# Patient Record
Sex: FEMALE | Race: WHITE | NOT HISPANIC OR LATINO | Employment: UNEMPLOYED | URBAN - METROPOLITAN AREA
[De-identification: names, ages, dates, MRNs, and addresses within clinical notes are randomized per-mention and may not be internally consistent; named-entity substitution may affect disease eponyms.]

---

## 2018-06-01 ENCOUNTER — OFFICE VISIT CONVERTED (OUTPATIENT)
Dept: GASTROENTEROLOGY | Facility: HOSPITAL | Age: 43
End: 2018-06-01
Attending: NURSE PRACTITIONER

## 2019-08-15 ENCOUNTER — OFFICE VISIT CONVERTED (OUTPATIENT)
Dept: FAMILY MEDICINE CLINIC | Facility: CLINIC | Age: 44
End: 2019-08-15
Attending: NURSE PRACTITIONER

## 2019-08-15 ENCOUNTER — HOSPITAL ENCOUNTER (OUTPATIENT)
Dept: FAMILY MEDICINE CLINIC | Facility: CLINIC | Age: 44
Discharge: HOME | End: 2019-08-15
Attending: NURSE PRACTITIONER

## 2019-08-15 LAB
ALBUMIN SERPL-MCNC: 4.2 G/DL (ref 3.5–5)
ALBUMIN/GLOB SERPL: 1.2 {RATIO} (ref 1.4–2.6)
ALP SERPL-CCNC: 84 U/L (ref 42–98)
ALT SERPL-CCNC: 48 U/L (ref 10–40)
ANION GAP SERPL CALC-SCNC: 16 MMOL/L (ref 8–19)
AST SERPL-CCNC: 48 U/L (ref 15–50)
BASOPHILS # BLD AUTO: 0.05 10*3/UL (ref 0–0.2)
BASOPHILS NFR BLD AUTO: 0.4 % (ref 0–3)
BILIRUB SERPL-MCNC: 0.38 MG/DL (ref 0.2–1.3)
BUN SERPL-MCNC: 10 MG/DL (ref 5–25)
BUN/CREAT SERPL: 12 {RATIO} (ref 6–20)
CALCIUM SERPL-MCNC: 9.2 MG/DL (ref 8.7–10.4)
CHLORIDE SERPL-SCNC: 102 MMOL/L (ref 99–111)
CHOLEST SERPL-MCNC: 252 MG/DL (ref 107–200)
CHOLEST/HDLC SERPL: 2.9 {RATIO} (ref 3–6)
CONV ABS IMM GRAN: 0.05 10*3/UL (ref 0–0.2)
CONV CO2: 24 MMOL/L (ref 22–32)
CONV IMMATURE GRAN: 0.4 % (ref 0–1.8)
CONV TOTAL PROTEIN: 7.6 G/DL (ref 6.3–8.2)
CREAT UR-MCNC: 0.86 MG/DL (ref 0.5–0.9)
DEPRECATED RDW RBC AUTO: 50.5 FL (ref 36.4–46.3)
EOSINOPHIL # BLD AUTO: 0.16 10*3/UL (ref 0–0.7)
EOSINOPHIL # BLD AUTO: 1.4 % (ref 0–7)
ERYTHROCYTE [DISTWIDTH] IN BLOOD BY AUTOMATED COUNT: 13.9 % (ref 11.7–14.4)
GFR SERPLBLD BASED ON 1.73 SQ M-ARVRAT: >60 ML/MIN/{1.73_M2}
GLOBULIN UR ELPH-MCNC: 3.4 G/DL (ref 2–3.5)
GLUCOSE SERPL-MCNC: 93 MG/DL (ref 65–99)
HCT VFR BLD AUTO: 46.6 % (ref 37–47)
HDLC SERPL-MCNC: 86 MG/DL (ref 40–60)
HGB BLD-MCNC: 15.4 G/DL (ref 12–16)
LDLC SERPL CALC-MCNC: 147 MG/DL (ref 70–100)
LYMPHOCYTES # BLD AUTO: 2.16 10*3/UL (ref 1–5)
LYMPHOCYTES NFR BLD AUTO: 18.3 % (ref 20–45)
MCH RBC QN AUTO: 32.2 PG (ref 27–31)
MCHC RBC AUTO-ENTMCNC: 33 G/DL (ref 33–37)
MCV RBC AUTO: 97.5 FL (ref 81–99)
MONOCYTES # BLD AUTO: 0.51 10*3/UL (ref 0.2–1.2)
MONOCYTES NFR BLD AUTO: 4.3 % (ref 3–10)
NEUTROPHILS # BLD AUTO: 8.85 10*3/UL (ref 2–8)
NEUTROPHILS NFR BLD AUTO: 75.2 % (ref 30–85)
NRBC CBCN: 0 % (ref 0–0.7)
OSMOLALITY SERPL CALC.SUM OF ELEC: 285 MOSM/KG (ref 273–304)
PLATELET # BLD AUTO: 208 10*3/UL (ref 130–400)
PMV BLD AUTO: 11.9 FL (ref 9.4–12.3)
POTASSIUM SERPL-SCNC: 4.3 MMOL/L (ref 3.5–5.3)
RBC # BLD AUTO: 4.78 10*6/UL (ref 4.2–5.4)
SODIUM SERPL-SCNC: 138 MMOL/L (ref 135–147)
TRIGL SERPL-MCNC: 97 MG/DL (ref 40–150)
TSH SERPL-ACNC: 0.87 M[IU]/L (ref 0.27–4.2)
VLDLC SERPL-MCNC: 19 MG/DL (ref 5–37)
WBC # BLD AUTO: 11.78 10*3/UL (ref 4.8–10.8)

## 2021-05-15 VITALS
SYSTOLIC BLOOD PRESSURE: 132 MMHG | OXYGEN SATURATION: 99 % | HEART RATE: 92 BPM | HEIGHT: 68 IN | TEMPERATURE: 98.1 F | BODY MASS INDEX: 28.21 KG/M2 | DIASTOLIC BLOOD PRESSURE: 82 MMHG | WEIGHT: 186.12 LBS

## 2021-05-28 VITALS
SYSTOLIC BLOOD PRESSURE: 105 MMHG | HEIGHT: 68 IN | WEIGHT: 186.12 LBS | BODY MASS INDEX: 28.21 KG/M2 | DIASTOLIC BLOOD PRESSURE: 72 MMHG

## 2021-05-28 NOTE — PROGRESS NOTES
Patient: NOE PEARCE     Acct: [id number]     Report: #[id number]  UNIT #: [id number]     : 1975    Encounter Date:2018  PRIMARY CARE:   ***ISigned***  --------------------------------------------------------------------------------------------------------------------  DATE: 18      LOGAN BHAKTA      Chief Complaint      HEPATITIS C W/O HEPATIC COMA            Allergies      Coded Allergies:             *No Known Allergies (Verified  Allergy, Mild, NONE, 17)            Medications      Last Reconciled on 18 11:08 by JUAN REINA      Sertraline HCl (Sertraline*) 20 Mg/1 Ml Oral.conc      50 MG PO QDAY, ML         Reported         18       (Zoloft)   No Conflict Check               Reported         06            Vitals      Height 5 ft 8.00 in / 172.72 cm      Weight 186 lbs 2.000 oz / 84.832621 kg      BSA 2.03 m2      BMI 28.3 kg/m2      Blood Pressure 105/72            Yes: Hx Abdominal Surgery ( x 2)      Cancer/Type - Family Hx:  Father (lymphoma)      Social History:  Tobacco Use (1 ppd), Alcohol Use      Smoking status:  Current every day smoker (1 ppd)      Smoking history:  25-50 pack years      Counseling given:  Patient declined      Medical History:  No: Hx Sleep Apnea      Psychiatric History:  Yes: Hx Depression (TAKES ZOLOFT 150MG), Hx Anxiety            PREVENTION      Hx Influenza Vaccination:  No      Hx Pneumococcal Vaccination:  No      Chart initiated by      DL            General:  No Fatigue, No Weight Loss      HEENT:  No Dysphagia, No Visual Changes      Respiratory:  No Cough, No Dyspnea      Cardiology:  No Chest Pain, No Palpitations      Gastrointestinal:  No Diarrhea, No Constipation      Genitourinary:  No Dysuria, No Frequency      Musculoskeletal:  No Joint Tenderness, No Joint Stiffness      Endocrine:  No Cold Intolerance, No Fatigue      Hematologic:  No Bleeding, No Bruising      Psychologic:  No Anxiety, No  Depression      Neurologic:  No Confusion, No Weakness      Skin:  No Rash, No Open Wounds            She presents for evaluation and treatment of chronic hepatitis C.  She reports     that she was diagnosed in April per her primary care provider.  She admits a     history of heroin use.  She states that she last used 18 months ago.  She     denies any high-risk behavior since that time.  She reports occasional pain in     the right side that occurs about every other day.  She denies precipitating and     eliminating factors.  She states that it is uncomfortable and typically     subsides on its own.  She admits current alcohol use.  She drinks a wine cooler     approximately 3 days a week after work.            HEENT:  Atraumatic, No Scleral Icterus      Lungs:  CTAB, Breathing is unlabored      Abdomen:  Normal BS all 4 Quadrants, Soft      Cardiovascular:  Regular Rate and Rhythm, No Murmur      Constitutional:  Healthy appearing, No Acute Distress      Neurological:  Mental Status WNL, Alert+Ox3      Musculoskeletal:  Normal Bulk Strength, Normal Tone      Skin:  No Rash, No Swelling      Rectal:  Deferred            Lab Results      4/26/2018 CMP: Alk phos 84, AST 88, , total bilirubin 0.4      Hepatitis A antibody-nonreactive, hepatitis C antibody-reactive, HCV quant-    112744.            Beatris Stiffness Consistent with:  F0-F1      CAP Score:  Normal/Mild Liver Fat            Current Plan      Obtain labs today.  Schedule for right upper quadrant ultrasound.  Treatment     plan to be determined based on results.      Chronic hepatitis C       Chronic hepatitis C without hepatic coma       Hepatic coma status: without hepatic coma            Notes      New Medications      * Sertraline HCl (Sertraline*) 20 MG/1 ML ORAL.CONC: 50 MG PO QDAY      Discontinued Medications      * Metronidazole (Flagyl*) 500 MG TABLET: 500 MG PO TID 7 Days #21      New Diagnostics      * Acute Hepatitis Pane, Stat       Dx:  Chronic hepatitis C - B18.2      * HCV RNA QUANTITATIVE HCPCR, Stat       Dx: Chronic hepatitis C - B18.2      * HEPATITIS C GENOTYPE PCR HEPCT, Stat       Dx: Chronic hepatitis C - B18.2      * Hepatitis B Core Ant, Stat       Dx: Chronic hepatitis C - B18.2      * Alcohol Blood/Ethano, Stat       Dx: Chronic hepatitis C - B18.2      * CBC, Stat       Dx: Chronic hepatitis C - B18.2      * Comp Metabolic Panel, Stat       Dx: Chronic hepatitis C - B18.2      * Fibrosure Hcv, Stat       Dx: Chronic hepatitis C - B18.2      * Hiv 1 By Eia W/West , Stat       Dx: Chronic hepatitis C - B18.2      * Drug Screen Serum (9, Stat       Dx: Chronic hepatitis C - B18.2      * PT / INR, Stat       Dx: Chronic hepatitis C - B18.2      * US ABDOMEN LIMITED, SCHEDULED PROCEDURE       Dx: Chronic hepatitis C - B18.2      Patient Education Provided:  Yes      Patient Instructions:  Avoid Alcohol, Avoid Illicit Drug Use, Importance of     keeping appointments      Disposition:  Other (Follow-up to be determined based on lab results and plan.)            Electronically signed by Milla Wolfe  06/01/2018 11:08       Disclaimer: Converted document may not contain table formatting or lab diagrams. Please see [employer] for the authenticated document.

## 2022-07-18 ENCOUNTER — OFFICE VISIT (OUTPATIENT)
Dept: FAMILY MEDICINE CLINIC | Facility: CLINIC | Age: 47
End: 2022-07-18

## 2022-07-18 VITALS
OXYGEN SATURATION: 98 % | BODY MASS INDEX: 26.37 KG/M2 | SYSTOLIC BLOOD PRESSURE: 102 MMHG | DIASTOLIC BLOOD PRESSURE: 64 MMHG | HEIGHT: 68 IN | WEIGHT: 174 LBS | HEART RATE: 81 BPM

## 2022-07-18 DIAGNOSIS — G47.00 INSOMNIA, UNSPECIFIED TYPE: ICD-10-CM

## 2022-07-18 DIAGNOSIS — F33.9 EPISODE OF RECURRENT MAJOR DEPRESSIVE DISORDER, UNSPECIFIED DEPRESSION EPISODE SEVERITY: Primary | ICD-10-CM

## 2022-07-18 DIAGNOSIS — F42.9 OBSESSIVE-COMPULSIVE DISORDER, UNSPECIFIED TYPE: ICD-10-CM

## 2022-07-18 DIAGNOSIS — F10.11 HISTORY OF ALCOHOL ABUSE: ICD-10-CM

## 2022-07-18 PROCEDURE — 99204 OFFICE O/P NEW MOD 45 MIN: CPT | Performed by: INTERNAL MEDICINE

## 2022-07-18 RX ORDER — PRAZOSIN HYDROCHLORIDE 1 MG/1
CAPSULE ORAL
COMMUNITY
Start: 2022-06-15 | End: 2022-12-05 | Stop reason: HOSPADM

## 2022-07-18 RX ORDER — MAGNESIUM OXIDE 400 MG/1
TABLET ORAL
COMMUNITY
Start: 2022-05-20 | End: 2022-12-05 | Stop reason: HOSPADM

## 2022-07-18 RX ORDER — SERTRALINE HYDROCHLORIDE 100 MG/1
100 TABLET, FILM COATED ORAL 2 TIMES DAILY
COMMUNITY
Start: 2022-05-24 | End: 2022-07-18

## 2022-07-18 RX ORDER — ACETAMINOPHEN 160 MG
TABLET,DISINTEGRATING ORAL
COMMUNITY
Start: 2022-06-14 | End: 2022-12-05 | Stop reason: HOSPADM

## 2022-07-18 RX ORDER — DIPHENHYDRAMINE HYDROCHLORIDE 25 MG/1
CAPSULE ORAL
COMMUNITY
Start: 2022-06-14 | End: 2022-12-05 | Stop reason: HOSPADM

## 2022-07-18 RX ORDER — CALCIUM CARBONATE/VITAMIN D3 600 MG-10
TABLET ORAL
COMMUNITY
Start: 2022-06-30 | End: 2022-12-05 | Stop reason: HOSPADM

## 2022-07-18 RX ORDER — QUETIAPINE FUMARATE 200 MG/1
200 TABLET, FILM COATED ORAL NIGHTLY
Qty: 90 TABLET | Refills: 3 | Status: SHIPPED | OUTPATIENT
Start: 2022-07-18 | End: 2022-09-13

## 2022-07-18 RX ORDER — ESCITALOPRAM OXALATE 10 MG/1
10 TABLET ORAL DAILY
Qty: 30 TABLET | Refills: 0 | Status: SHIPPED | OUTPATIENT
Start: 2022-07-18 | End: 2022-08-22

## 2022-07-18 RX ORDER — OMEPRAZOLE 20 MG/1
CAPSULE, DELAYED RELEASE ORAL
COMMUNITY
Start: 2022-06-15 | End: 2022-12-05 | Stop reason: HOSPADM

## 2022-07-18 RX ORDER — MULTIVITAMIN WITH FOLIC ACID 400 MCG
TABLET ORAL
COMMUNITY
Start: 2022-06-14 | End: 2022-12-05 | Stop reason: HOSPADM

## 2022-07-18 RX ORDER — HYDROXYZINE HYDROCHLORIDE 25 MG/1
25 TABLET, FILM COATED ORAL 3 TIMES DAILY PRN
COMMUNITY
Start: 2022-06-23 | End: 2022-07-18

## 2022-07-18 RX ORDER — FOLIC ACID 1 MG/1
TABLET ORAL
COMMUNITY
Start: 2022-06-14 | End: 2022-12-05 | Stop reason: HOSPADM

## 2022-07-18 RX ORDER — BUPRENORPHINE HYDROCHLORIDE AND NALOXONE HYDROCHLORIDE DIHYDRATE 2; .5 MG/1; MG/1
TABLET SUBLINGUAL
COMMUNITY
Start: 2022-06-27

## 2022-07-18 RX ORDER — CHOLECALCIFEROL (VITAMIN D3) 125 MCG
CAPSULE ORAL
COMMUNITY
Start: 2022-06-14

## 2022-07-18 RX ORDER — HYDROXYZINE 50 MG/1
50 TABLET, FILM COATED ORAL NIGHTLY PRN
Qty: 90 TABLET | Refills: 1 | Status: SHIPPED | OUTPATIENT
Start: 2022-07-18 | End: 2022-09-21 | Stop reason: SDUPTHER

## 2022-07-18 NOTE — PROGRESS NOTES
Chief Complaint   Patient presents with   • Establish Care     General check up - moved from [facility]    • Insomnia     Discuss meds and dosages        HPI:  Shannan Covarrubias is a 46 y.o. female who presents today for establish care.  Currently resides at the [facility], in rehab for alcohol abuse.  Her main concern today is difficulty sleeping.    ROS:  Constitutional: no fevers, night sweats or unexplained weight loss  Eyes: no vision changes  ENT: no runny nose, ear pain, sore throat  Cardio: no chest pain, palpitations  Pulm: no shortness of breath, wheezing, or cough  GI: no abdominal pain or changes in bowel movements  : no difficulty urinating  MSK: no difficulty ambulating, no joint pain  Neuro: no weakness, dizziness or headache  Psych: no trouble sleeping  Endo: no change in appetite      History reviewed. No pertinent past medical history.   History reviewed. No pertinent family history.   Social History     Socioeconomic History   • Marital status:    Tobacco Use   • Smoking status: Current Every Day Smoker     Packs/day: 1.00     Years: 35.00     Pack years: 35.00   • Smokeless tobacco: Never Used   Substance and Sexual Activity   • Alcohol use: Not Currently   • Drug use: Not Currently      No Known Allergies   Immunization History   Administered Date(s) Administered   • COVID-19 (MODERNA) 1st, 2nd, 3rd Dose Only 08/25/2021        PE:  Vitals:    07/18/22 1443   BP: 102/64   Pulse: 81   SpO2: 98%      Body mass index is 26.46 kg/m².    Gen Appearance: NAD  HEENT: Normocephalic, PERRLA, no thyromegaly, trache midline  Heart: RRR, normal S1 and S2, no murmur  Lungs: CTA b/l, no wheezing, no crackles  Abdomen: Soft, non-tender, non-distended, no guarding and BSx4  MSK: Moves all extremities well, normal gait, no peripheral edema  Pulses: Palpable and equal b/l  Lymph nodes: No palpable lymphadenopathy   Neuro: No focal deficits      Current Outpatient Medications   Medication Sig Dispense  Refill   • buprenorphine-naloxone (SUBOXONE) 2-0.5 MG per SL tablet DISSOLVE TWO TABLETS UNDER THE TONGUE EVERY DAY     • Cholecalciferol (Vitamin D3) 50 MCG (2000 UT) capsule      • folic acid (FOLVITE) 1 MG tablet      • magnesium oxide (MAG-OX) 400 MG tablet      • melatonin 5 MG tablet tablet      • multivitamin with minerals tablet tablet      • omeprazole (priLOSEC) 20 MG capsule      • prazosin (MINIPRESS) 1 MG capsule      • Thiamine Mononitrate (B1) 100 MG tablet      • vitamin B-6 (PYRIDOXINE) 25 MG tablet      • escitalopram (Lexapro) 10 MG tablet Take 1 tablet by mouth Daily. 30 tablet 0   • hydrOXYzine (ATARAX) 50 MG tablet Take 1 tablet by mouth At Night As Needed (insomnia). 90 tablet 1   • QUEtiapine (SEROquel) 200 MG tablet Take 1 tablet by mouth Every Night. 90 tablet 3     No current facility-administered medications for this visit.        Diagnoses and all orders for this visit:    1. Episode of recurrent major depressive disorder, unspecified depression episode severity (HCC) (Primary)  Switch to Lexapro.  She feels that Zoloft is no longer effective.  She will likely need to move up to 20 mg daily of Lexapro after 1 to 2 weeks.  2. Obsessive-compulsive disorder, unspecified type  See above.  3. History of alcohol abuse  Rehab at the [facility].  4. Insomnia, unspecified type  Continue Seroquel nightly.  Add on hydroxyzine as needed.  Other orders  -     QUEtiapine (SEROquel) 200 MG tablet; Take 1 tablet by mouth Every Night.  Dispense: 90 tablet; Refill: 3  -     hydrOXYzine (ATARAX) 50 MG tablet; Take 1 tablet by mouth At Night As Needed (insomnia).  Dispense: 90 tablet; Refill: 1  -     escitalopram (Lexapro) 10 MG tablet; Take 1 tablet by mouth Daily.  Dispense: 30 tablet; Refill: 0         Return in about 4 weeks (around 8/15/2022) for Annual.     Dictated Utilizing Dragon Dictation    Please note that portions of this note were completed with a voice recognition program.    Part of this  note may be an electronic transcription/translation of spoken language to printed text using the Dragon Dictation System.

## 2022-08-19 ENCOUNTER — TELEPHONE (OUTPATIENT)
Dept: FAMILY MEDICINE CLINIC | Facility: CLINIC | Age: 47
End: 2022-08-19

## 2022-08-19 NOTE — TELEPHONE ENCOUNTER
Caller: Shannan Covarrubias    Relationship: Self    Best call back number: [phone]    Requested Prescriptions:   Requested Prescriptions      No prescriptions requested or ordered in this encounter      E[facility]     Pharmacy where request should be sent: [facility] [place] [address] [address] AT [facility] [phone] [facility] [phone]      Additional details provided by patient:  PATIENT OUT OF MEDICATION.     Does the patient have less than a 3 day supply:  [x] Yes  [] No    Bethany Connor   08/19/22 09:51 EDT

## 2022-08-22 RX ORDER — ESCITALOPRAM OXALATE 10 MG/1
TABLET ORAL
Qty: 30 TABLET | Refills: 0 | Status: SHIPPED | OUTPATIENT
Start: 2022-08-22 | End: 2022-09-13 | Stop reason: SDUPTHER

## 2022-08-22 NOTE — TELEPHONE ENCOUNTER
Rx Refill Note  Requested Prescriptions     Pending Prescriptions Disp Refills   • escitalopram (LEXAPRO) 10 MG tablet [Pharmacy Med Name: ESCITALOPRAM 10 MG TABLET] 30 tablet 0     Sig: TAKE ONE TABLET BY MOUTH DAILY      Last office visit with prescribing clinician: 7/18/2022      Next office visit with prescribing clinician: 9/23/2022            Leidy Salazar MA  08/22/22, 09:20 EDT

## 2022-08-24 NOTE — TELEPHONE ENCOUNTER
Attempted to contact, no answer. Left detailed voicemail relaying provider's message     She is supposed to be taking Lexapro, not Effexor   Hub can relay and document.

## 2022-09-13 ENCOUNTER — OFFICE VISIT (OUTPATIENT)
Dept: FAMILY MEDICINE CLINIC | Facility: CLINIC | Age: 47
End: 2022-09-13

## 2022-09-13 VITALS
OXYGEN SATURATION: 96 % | BODY MASS INDEX: 28.19 KG/M2 | HEIGHT: 68 IN | DIASTOLIC BLOOD PRESSURE: 76 MMHG | WEIGHT: 186 LBS | TEMPERATURE: 98.1 F | SYSTOLIC BLOOD PRESSURE: 122 MMHG | HEART RATE: 88 BPM

## 2022-09-13 DIAGNOSIS — Z12.11 COLON CANCER SCREENING: ICD-10-CM

## 2022-09-13 DIAGNOSIS — Z23 IMMUNIZATION DUE: ICD-10-CM

## 2022-09-13 DIAGNOSIS — F42.9 OBSESSIVE-COMPULSIVE DISORDER, UNSPECIFIED TYPE: ICD-10-CM

## 2022-09-13 DIAGNOSIS — E55.9 VITAMIN D DEFICIENCY: ICD-10-CM

## 2022-09-13 DIAGNOSIS — F33.9 EPISODE OF RECURRENT MAJOR DEPRESSIVE DISORDER, UNSPECIFIED DEPRESSION EPISODE SEVERITY: ICD-10-CM

## 2022-09-13 DIAGNOSIS — Z12.31 ENCOUNTER FOR SCREENING MAMMOGRAM FOR MALIGNANT NEOPLASM OF BREAST: ICD-10-CM

## 2022-09-13 DIAGNOSIS — Z00.00 PREVENTATIVE HEALTH CARE: Primary | ICD-10-CM

## 2022-09-13 DIAGNOSIS — Z11.59 ENCOUNTER FOR HEPATITIS C SCREENING TEST FOR LOW RISK PATIENT: ICD-10-CM

## 2022-09-13 DIAGNOSIS — Z72.0 TOBACCO ABUSE: ICD-10-CM

## 2022-09-13 PROCEDURE — 90677 PCV20 VACCINE IM: CPT | Performed by: INTERNAL MEDICINE

## 2022-09-13 PROCEDURE — 90471 IMMUNIZATION ADMIN: CPT | Performed by: INTERNAL MEDICINE

## 2022-09-13 PROCEDURE — 99396 PREV VISIT EST AGE 40-64: CPT | Performed by: INTERNAL MEDICINE

## 2022-09-13 RX ORDER — TRAZODONE HYDROCHLORIDE 100 MG/1
100 TABLET ORAL NIGHTLY
Qty: 30 TABLET | Refills: 1 | Status: SHIPPED | OUTPATIENT
Start: 2022-09-13 | End: 2022-12-05 | Stop reason: SDUPTHER

## 2022-09-13 RX ORDER — TRAZODONE HYDROCHLORIDE 100 MG/1
100 TABLET ORAL NIGHTLY
COMMUNITY
End: 2022-09-13 | Stop reason: SDUPTHER

## 2022-09-13 RX ORDER — ESCITALOPRAM OXALATE 10 MG/1
10 TABLET ORAL DAILY
Qty: 30 TABLET | Refills: 0 | Status: SHIPPED | OUTPATIENT
Start: 2022-09-13 | End: 2022-10-04 | Stop reason: SDUPTHER

## 2022-09-14 NOTE — PROGRESS NOTES
Chief Complaint   Patient presents with   • Annual Exam   • Insomnia   • Depression     Pt. States she may need to go up on her medication for Depression.     • Med Refill       HPI:  Shannan Covarrubias is a 46 y.o. female who presents today for annual exam.  She would like to discuss worsening depression symptoms as well.    ROS:  Constitutional: no fevers, night sweats or unexplained weight loss  Eyes: no vision changes  ENT: no runny nose, ear pain, sore throat  Cardio: no chest pain, palpitations  Pulm: no shortness of breath, wheezing, or cough  GI: no abdominal pain or changes in bowel movements  : no difficulty urinating  MSK: no difficulty ambulating, no joint pain  Neuro: no weakness, dizziness or headache  Psych: no trouble sleeping  Endo: no change in appetite      History reviewed. No pertinent past medical history.   History reviewed. No pertinent family history.   Social History     Socioeconomic History   • Marital status:    Tobacco Use   • Smoking status: Current Every Day Smoker     Packs/day: 1.00     Years: 35.00     Pack years: 35.00   • Smokeless tobacco: Never Used   Vaping Use   • Vaping Use: Never used   Substance and Sexual Activity   • Alcohol use: Not Currently   • Drug use: Not Currently      No Known Allergies   Immunization History   Administered Date(s) Administered   • COVID-19 (MODERNA) 1st, 2nd, 3rd Dose Only 08/25/2021   • Pneumococcal Conjugate 20-Valent (PCV20) 09/13/2022        PE:  Vitals:    09/13/22 1225   BP: 122/76   Pulse: 88   Temp: 98.1 °F (36.7 °C)   SpO2: 96%      Body mass index is 28.29 kg/m².    Gen Appearance: NAD  HEENT: Normocephalic, PERRLA, no thyromegaly, trache midline  Heart: RRR, normal S1 and S2, no murmur  Lungs: CTA b/l, no wheezing, no crackles  Abdomen: Soft, non-tender, non-distended, no guarding and BSx4  MSK: Moves all extremities well, normal gait, no peripheral edema  Pulses: Palpable and equal b/l  Lymph nodes: No palpable lymphadenopathy    Neuro: No focal deficits      Current Outpatient Medications   Medication Sig Dispense Refill   • buprenorphine-naloxone (SUBOXONE) 2-0.5 MG per SL tablet DISSOLVE TWO TABLETS UNDER THE TONGUE EVERY DAY     • Cholecalciferol (Vitamin D3) 50 MCG (2000 UT) capsule      • escitalopram (LEXAPRO) 10 MG tablet Take 1 tablet by mouth Daily. 30 tablet 0   • folic acid (FOLVITE) 1 MG tablet      • hydrOXYzine (ATARAX) 50 MG tablet Take 1 tablet by mouth At Night As Needed (insomnia). 90 tablet 1   • magnesium oxide (MAG-OX) 400 MG tablet      • melatonin 5 MG tablet tablet      • multivitamin with minerals tablet tablet      • omeprazole (priLOSEC) 20 MG capsule      • prazosin (MINIPRESS) 1 MG capsule      • Thiamine Mononitrate (B1) 100 MG tablet      • traZODone (DESYREL) 100 MG tablet Take 1 tablet by mouth Every Night. 30 tablet 1   • vitamin B-6 (PYRIDOXINE) 25 MG tablet        No current facility-administered medications for this visit.        Diagnoses and all orders for this visit:    1. Preventative health care (Primary)  -     CBC & Differential; Future  -     Comprehensive Metabolic Panel; Future  -     Hemoglobin A1c; Future  -     Lipid Panel; Future  -     TSH+Free T4; Future  Counseled on healthy weight, nutrition, physical activity, cancer screening, and immunizations.    2. Episode of recurrent major depressive disorder, unspecified depression episode severity (HCC)  -     GeneSight - Swab,; Future    3. Obsessive-compulsive disorder, unspecified type  -     GeneSight - Swab,; Future    4. Vitamin D deficiency  -     Vitamin D 25 Hydroxy; Future    5. Colon cancer screening    6. Encounter for screening mammogram for malignant neoplasm of breast  -     Mammo Screening Digital Tomosynthesis Bilateral With CAD; Future    7. Tobacco abuse    8. Encounter for hepatitis C screening test for low risk patient  -     Hepatitis C Antibody; Future    9. Immunization due    Other orders  -     escitalopram (LEXAPRO)  10 MG tablet; Take 1 tablet by mouth Daily.  Dispense: 30 tablet; Refill: 0  -     traZODone (DESYREL) 100 MG tablet; Take 1 tablet by mouth Every Night.  Dispense: 30 tablet; Refill: 1  -     Pneumococcal Conjugate Vaccine 20-Valent (PCV20)         No follow-ups on file.     Dictated Utilizing Dragon Dictation    Please note that portions of this note were completed with a voice recognition program.    Part of this note may be an electronic transcription/translation of spoken language to printed text using the Dragon Dictation System.

## 2022-09-21 RX ORDER — HYDROXYZINE 50 MG/1
50 TABLET, FILM COATED ORAL NIGHTLY PRN
Qty: 90 TABLET | Refills: 1 | Status: SHIPPED | OUTPATIENT
Start: 2022-09-21

## 2022-09-21 NOTE — TELEPHONE ENCOUNTER
Caller: Shannan Covarrubias    Relationship: Self    Best call back number: [phone]    Requested Prescriptions:   Requested Prescriptions     Pending Prescriptions Disp Refills   • hydrOXYzine (ATARAX) 50 MG tablet 90 tablet 1     Sig: Take 1 tablet by mouth At Night As Needed (insomnia).        Pharmacy where request should be sent: [facility] [place] [address] [address] AT [facility] [phone] [facility] [phone] FX     Does the patient have less than a 3 day supply:  [x] Yes  [] No    Johanny Baltazar Rep   09/21/22 11:39 EDT

## 2022-09-21 NOTE — TELEPHONE ENCOUNTER
Patient is requesting Aivo test result    Rx Refill Note  Requested Prescriptions     Pending Prescriptions Disp Refills   • hydrOXYzine (ATARAX) 50 MG tablet 90 tablet 1     Sig: Take 1 tablet by mouth At Night As Needed (insomnia).      Last office visit with prescribing clinician: 9/13/2022      Next office visit with prescribing clinician: Visit date not found            Cate Bowman MA  09/21/22, 11:51 EDT

## 2022-10-04 NOTE — TELEPHONE ENCOUNTER
Caller: Shannan Covarrubias    Relationship: Self    Best call back number: [phone]    Requested Prescriptions:   Requested Prescriptions     Pending Prescriptions Disp Refills   • escitalopram (LEXAPRO) 10 MG tablet 30 tablet 0     Sig: Take 1 tablet by mouth Daily.   • hydrOXYzine (ATARAX) 50 MG tablet 90 tablet 1     Sig: Take 1 tablet by mouth At Night As Needed (insomnia).        Pharmacy where request should be sent: [facility] [id number] [facility] - [phone] [facility] [phone] FX     Additional details provided by patient: PATIENT IS OUT OF THIS MEDICATION AND WANTS TO KNOW IF SHE CAN GET A REFILL TODAY 10.04.22 SINCE SHE WILL BE GOING OUT OF TOWN TOMORROW 10.05.22- Thursday 10.06.22  PATIENT ALSO WAS WONDERING IF HER TEST RESULTS ALLOWED HER TO DOUBLE HER DOSAGE OF THE LEXAPRO.    PATIENTS PREVIOUS PRESCRIPTIONS OF THE HYDROXYZINE WAS 1-2 PILLS AND THE MOST RECENT SAID ONE AND SHE IS REQUESTING TO HAVE IT TO WHERE SHE CAN TAKE TWO.      Does the patient have less than a 3 day supply:  [x] Yes  [] No    Johanny Vargas Rep   10/04/22 10:36 EDT

## 2022-10-05 RX ORDER — ESCITALOPRAM OXALATE 10 MG/1
10 TABLET ORAL DAILY
Qty: 30 TABLET | Refills: 0 | Status: SHIPPED | OUTPATIENT
Start: 2022-10-05 | End: 2022-12-05 | Stop reason: HOSPADM

## 2022-10-05 RX ORDER — HYDROXYZINE 50 MG/1
50 TABLET, FILM COATED ORAL NIGHTLY PRN
Qty: 90 TABLET | Refills: 1 | OUTPATIENT
Start: 2022-10-05

## 2022-10-05 NOTE — TELEPHONE ENCOUNTER
Caller: Shannan Covarrubias    Relationship: Self    Best call back number:     Requested Prescriptions:   Requested Prescriptions     Pending Prescriptions Disp Refills   • escitalopram (LEXAPRO) 10 MG tablet 30 tablet 0     Sig: Take 1 tablet by mouth Daily.   • hydrOXYzine (ATARAX) 50 MG tablet 90 tablet 1     Sig: Take 1 tablet by mouth At Night As Needed (insomnia).        Pharmacy where request should be sent: [facility] [id number] [place] [id number] AT [facility] - [phone] [facility] [phone] FX     Additional details provided by patient: PATIENT IS OUT OF MEDICATION AND GOING OUT OF TOWN    Does the patient have less than a 3 day supply:  [x] Yes  [] No    Johanny Oglesby Rep   10/05/22 11:01 EDT

## 2022-10-05 NOTE — TELEPHONE ENCOUNTER
Refilling lexapro since pt is going out of town. Hydroxyzine has additional refills at the pharmacy.

## 2022-10-06 ENCOUNTER — TELEPHONE (OUTPATIENT)
Dept: FAMILY MEDICINE CLINIC | Facility: CLINIC | Age: 47
End: 2022-10-06

## 2022-10-06 DIAGNOSIS — F33.9 EPISODE OF RECURRENT MAJOR DEPRESSIVE DISORDER, UNSPECIFIED DEPRESSION EPISODE SEVERITY: Primary | ICD-10-CM

## 2022-10-06 RX ORDER — BUPROPION HYDROCHLORIDE 150 MG/1
150 TABLET ORAL DAILY
Qty: 90 TABLET | Refills: 3 | Status: SHIPPED | OUTPATIENT
Start: 2022-10-06 | End: 2022-12-05 | Stop reason: SINTOL

## 2022-10-06 NOTE — TELEPHONE ENCOUNTER
----- Message from Sotero Joy DO sent at 10/6/2022 10:49 AM EDT -----  Please let patient know I sent in a new medication based on her Linguastat testing results.  Please schedule follow-up in 6 weeks after starting a new medication.

## 2022-10-07 NOTE — TELEPHONE ENCOUNTER
Lvm to call office back.  HUB CAN RELY AND SCHEDULE     ----- Message from Sotero Joy, DO sent at 10/6/2022 10:49 AM EDT -----  Please let patient know I sent in a new medication based on her GeneSight testing results.  Please schedule follow-up in 6 weeks after starting a new medication.

## 2022-10-07 NOTE — TELEPHONE ENCOUNTER
Attempted to contact, no answer. Left detailed voicemail relaying provider's message     Please let patient know I sent in a new medication based on her GeneSight testing results.  Please schedule follow-up in 6 weeks after starting a new medication.  Hub can relay and scheduled 6 week follow up

## 2022-12-05 ENCOUNTER — LAB (OUTPATIENT)
Dept: LAB | Facility: HOSPITAL | Age: 47
End: 2022-12-05

## 2022-12-05 ENCOUNTER — OFFICE VISIT (OUTPATIENT)
Dept: FAMILY MEDICINE CLINIC | Facility: CLINIC | Age: 47
End: 2022-12-05

## 2022-12-05 VITALS
OXYGEN SATURATION: 98 % | BODY MASS INDEX: 28.7 KG/M2 | HEART RATE: 79 BPM | HEIGHT: 68 IN | SYSTOLIC BLOOD PRESSURE: 118 MMHG | WEIGHT: 189.4 LBS | DIASTOLIC BLOOD PRESSURE: 84 MMHG

## 2022-12-05 DIAGNOSIS — F41.1 GENERALIZED ANXIETY DISORDER: Primary | ICD-10-CM

## 2022-12-05 DIAGNOSIS — Z23 IMMUNIZATION DUE: ICD-10-CM

## 2022-12-05 DIAGNOSIS — E55.9 VITAMIN D DEFICIENCY: ICD-10-CM

## 2022-12-05 DIAGNOSIS — Z11.59 ENCOUNTER FOR HEPATITIS C SCREENING TEST FOR LOW RISK PATIENT: ICD-10-CM

## 2022-12-05 DIAGNOSIS — G47.00 INSOMNIA, UNSPECIFIED TYPE: ICD-10-CM

## 2022-12-05 DIAGNOSIS — Z00.00 PREVENTATIVE HEALTH CARE: ICD-10-CM

## 2022-12-05 LAB
25(OH)D3 SERPL-MCNC: 27 NG/ML (ref 30–100)
ALBUMIN SERPL-MCNC: 4.1 G/DL (ref 3.5–5.2)
ALBUMIN/GLOB SERPL: 1.3 G/DL
ALP SERPL-CCNC: 73 U/L (ref 39–117)
ALT SERPL W P-5'-P-CCNC: 30 U/L (ref 1–33)
ANION GAP SERPL CALCULATED.3IONS-SCNC: 9.3 MMOL/L (ref 5–15)
AST SERPL-CCNC: 28 U/L (ref 1–32)
BASOPHILS # BLD AUTO: 0.06 10*3/MM3 (ref 0–0.2)
BASOPHILS NFR BLD AUTO: 0.8 % (ref 0–1.5)
BILIRUB SERPL-MCNC: <0.2 MG/DL (ref 0–1.2)
BUN SERPL-MCNC: 11 MG/DL (ref 6–20)
BUN/CREAT SERPL: 12.8 (ref 7–25)
CALCIUM SPEC-SCNC: 9.9 MG/DL (ref 8.6–10.5)
CHLORIDE SERPL-SCNC: 102 MMOL/L (ref 98–107)
CHOLEST SERPL-MCNC: 230 MG/DL (ref 0–200)
CO2 SERPL-SCNC: 29.7 MMOL/L (ref 22–29)
CREAT SERPL-MCNC: 0.86 MG/DL (ref 0.57–1)
DEPRECATED RDW RBC AUTO: 39.2 FL (ref 37–54)
EGFRCR SERPLBLD CKD-EPI 2021: 84.5 ML/MIN/1.73
EOSINOPHIL # BLD AUTO: 0.32 10*3/MM3 (ref 0–0.4)
EOSINOPHIL NFR BLD AUTO: 4.2 % (ref 0.3–6.2)
ERYTHROCYTE [DISTWIDTH] IN BLOOD BY AUTOMATED COUNT: 12.2 % (ref 12.3–15.4)
GLOBULIN UR ELPH-MCNC: 3.1 GM/DL
GLUCOSE SERPL-MCNC: 72 MG/DL (ref 65–99)
HCT VFR BLD AUTO: 44.1 % (ref 34–46.6)
HCV AB SER DONR QL: REACTIVE
HDLC SERPL-MCNC: 54 MG/DL (ref 40–60)
HGB BLD-MCNC: 14.9 G/DL (ref 12–15.9)
IMM GRANULOCYTES # BLD AUTO: 0.02 10*3/MM3 (ref 0–0.05)
IMM GRANULOCYTES NFR BLD AUTO: 0.3 % (ref 0–0.5)
LDLC SERPL CALC-MCNC: 146 MG/DL (ref 0–100)
LDLC/HDLC SERPL: 2.65 {RATIO}
LYMPHOCYTES # BLD AUTO: 3.28 10*3/MM3 (ref 0.7–3.1)
LYMPHOCYTES NFR BLD AUTO: 43.6 % (ref 19.6–45.3)
MCH RBC QN AUTO: 30 PG (ref 26.6–33)
MCHC RBC AUTO-ENTMCNC: 33.8 G/DL (ref 31.5–35.7)
MCV RBC AUTO: 88.7 FL (ref 79–97)
MONOCYTES # BLD AUTO: 0.56 10*3/MM3 (ref 0.1–0.9)
MONOCYTES NFR BLD AUTO: 7.4 % (ref 5–12)
NEUTROPHILS NFR BLD AUTO: 3.29 10*3/MM3 (ref 1.7–7)
NEUTROPHILS NFR BLD AUTO: 43.7 % (ref 42.7–76)
NRBC BLD AUTO-RTO: 0 /100 WBC (ref 0–0.2)
PLATELET # BLD AUTO: 253 10*3/MM3 (ref 140–450)
PMV BLD AUTO: 10.6 FL (ref 6–12)
POTASSIUM SERPL-SCNC: 4.3 MMOL/L (ref 3.5–5.2)
PROT SERPL-MCNC: 7.2 G/DL (ref 6–8.5)
RBC # BLD AUTO: 4.97 10*6/MM3 (ref 3.77–5.28)
SODIUM SERPL-SCNC: 141 MMOL/L (ref 136–145)
T4 FREE SERPL-MCNC: 1.18 NG/DL (ref 0.93–1.7)
TRIGL SERPL-MCNC: 165 MG/DL (ref 0–150)
TSH SERPL DL<=0.05 MIU/L-ACNC: 2.93 UIU/ML (ref 0.27–4.2)
VLDLC SERPL-MCNC: 30 MG/DL (ref 5–40)
WBC NRBC COR # BLD: 7.53 10*3/MM3 (ref 3.4–10.8)

## 2022-12-05 PROCEDURE — 99214 OFFICE O/P EST MOD 30 MIN: CPT | Performed by: INTERNAL MEDICINE

## 2022-12-05 PROCEDURE — 84443 ASSAY THYROID STIM HORMONE: CPT

## 2022-12-05 PROCEDURE — 85025 COMPLETE CBC W/AUTO DIFF WBC: CPT

## 2022-12-05 PROCEDURE — 80053 COMPREHEN METABOLIC PANEL: CPT

## 2022-12-05 PROCEDURE — 86803 HEPATITIS C AB TEST: CPT

## 2022-12-05 PROCEDURE — 80061 LIPID PANEL: CPT

## 2022-12-05 PROCEDURE — 83036 HEMOGLOBIN GLYCOSYLATED A1C: CPT

## 2022-12-05 PROCEDURE — 90686 IIV4 VACC NO PRSV 0.5 ML IM: CPT | Performed by: INTERNAL MEDICINE

## 2022-12-05 PROCEDURE — 90471 IMMUNIZATION ADMIN: CPT | Performed by: INTERNAL MEDICINE

## 2022-12-05 PROCEDURE — 82306 VITAMIN D 25 HYDROXY: CPT

## 2022-12-05 PROCEDURE — 84439 ASSAY OF FREE THYROXINE: CPT

## 2022-12-05 RX ORDER — SERTRALINE HYDROCHLORIDE 100 MG/1
100 TABLET, FILM COATED ORAL DAILY
Qty: 90 TABLET | Refills: 3 | Status: SHIPPED | OUTPATIENT
Start: 2022-12-05

## 2022-12-05 RX ORDER — TRAZODONE HYDROCHLORIDE 100 MG/1
100 TABLET ORAL NIGHTLY
Qty: 30 TABLET | Refills: 1 | Status: CANCELLED | OUTPATIENT
Start: 2022-12-05

## 2022-12-05 RX ORDER — TRAZODONE HYDROCHLORIDE 100 MG/1
100 TABLET ORAL NIGHTLY
Qty: 90 TABLET | Refills: 3 | Status: SHIPPED | OUTPATIENT
Start: 2022-12-05

## 2022-12-05 NOTE — PROGRESS NOTES
Chief Complaint   Patient presents with   • Depression     Medication refill    • Insomnia     Medication refill        HPI:  Shannan Covarrubias is a 46 y.o. female who presents today for follow-up anxiety and insomnia.  She stopped taking Wellbutrin 5 days ago due to side effects.  Symptoms have resolved.  She was having daily menopause symptoms when taking the medication.  She would like to resume taking Zoloft as this was really the only med that did not cause side effects.    ROS:  Constitutional: no fevers, night sweats or unexplained weight loss  Eyes: no vision changes  ENT: no runny nose, ear pain, sore throat  Cardio: no chest pain, palpitations  Pulm: no shortness of breath, wheezing, or cough  GI: no abdominal pain or changes in bowel movements  : no difficulty urinating  MSK: no difficulty ambulating, no joint pain  Neuro: no weakness, dizziness or headache  Psych: no trouble sleeping  Endo: no change in appetite      History reviewed. No pertinent past medical history.   History reviewed. No pertinent family history.   Social History     Socioeconomic History   • Marital status:    Tobacco Use   • Smoking status: Every Day     Packs/day: 1.00     Years: 35.00     Pack years: 35.00     Types: Cigarettes   • Smokeless tobacco: Never   Vaping Use   • Vaping Use: Never used   Substance and Sexual Activity   • Alcohol use: Not Currently   • Drug use: Not Currently      No Known Allergies   Immunization History   Administered Date(s) Administered   • COVID-19 (MODERNA) 1st, 2nd, 3rd Dose Only 08/25/2021   • FluLaval/Fluzone >6mos 12/05/2022   • Pneumococcal Conjugate 20-Valent (PCV20) 09/13/2022        PE:  Vitals:    12/05/22 0801   BP: 118/84   Pulse: 79   SpO2: 98%      Body mass index is 28.81 kg/m².    Gen Appearance: NAD  HEENT: Normocephalic, PERRLA, no thyromegaly, trache midline  Heart: RRR, normal S1 and S2, no murmur  Lungs: CTA b/l, no wheezing, no crackles  Abdomen: Soft, non-tender,  non-distended, no guarding and BSx4  MSK: Moves all extremities well, normal gait, no peripheral edema  Pulses: Palpable and equal b/l  Lymph nodes: No palpable lymphadenopathy   Neuro: No focal deficits      Current Outpatient Medications   Medication Sig Dispense Refill   • buprenorphine-naloxone (SUBOXONE) 2-0.5 MG per SL tablet DISSOLVE TWO TABLETS UNDER THE TONGUE EVERY DAY     • hydrOXYzine (ATARAX) 50 MG tablet Take 1 tablet by mouth At Night As Needed (insomnia). 90 tablet 1   • melatonin 5 MG tablet tablet      • traZODone (DESYREL) 100 MG tablet Take 1 tablet by mouth Every Night. 90 tablet 3   • sertraline (Zoloft) 100 MG tablet Take 1 tablet by mouth Daily. 90 tablet 3     No current facility-administered medications for this visit.        Diagnoses and all orders for this visit:    1. Generalized anxiety disorder (Primary)  DC Wellbutrin, starting on Zoloft.  Call or return to clinic if no improvement over the next 30 days.  2. Immunization due  -     FluLaval/Fluzone >6 mos ([id number])    3. Insomnia, unspecified type  Continue trazodone as needed.  Stable.  Other orders  -     traZODone (DESYREL) 100 MG tablet; Take 1 tablet by mouth Every Night.  Dispense: 90 tablet; Refill: 3  -     sertraline (Zoloft) 100 MG tablet; Take 1 tablet by mouth Daily.  Dispense: 90 tablet; Refill: 3         Return in about 9 months (around 9/5/2023) for Annual.     Dictated Utilizing Dragon Dictation    Please note that portions of this note were completed with a voice recognition program.    Part of this note may be an electronic transcription/translation of spoken language to printed text using the Dragon Dictation System.

## 2022-12-06 LAB — HBA1C MFR BLD: 5.4 % (ref 4.8–5.6)

## 2022-12-07 ENCOUNTER — TELEPHONE (OUTPATIENT)
Dept: FAMILY MEDICINE CLINIC | Facility: CLINIC | Age: 47
End: 2022-12-07

## 2022-12-07 DIAGNOSIS — B19.20 HEPATITIS C TEST POSITIVE: Primary | ICD-10-CM
